# Patient Record
Sex: FEMALE | Race: BLACK OR AFRICAN AMERICAN | NOT HISPANIC OR LATINO | URBAN - METROPOLITAN AREA
[De-identification: names, ages, dates, MRNs, and addresses within clinical notes are randomized per-mention and may not be internally consistent; named-entity substitution may affect disease eponyms.]

---

## 2020-06-03 ENCOUNTER — OFFICE VISIT (OUTPATIENT)
Dept: URBAN - METROPOLITAN AREA SURGERY CENTER 25 | Facility: SURGERY CENTER | Age: 65
End: 2020-06-03

## 2020-07-07 ENCOUNTER — OFFICE VISIT (OUTPATIENT)
Dept: URBAN - METROPOLITAN AREA CLINIC 113 | Facility: CLINIC | Age: 65
End: 2020-07-07

## 2020-07-25 ENCOUNTER — TELEPHONE ENCOUNTER (OUTPATIENT)
Dept: URBAN - METROPOLITAN AREA CLINIC 13 | Facility: CLINIC | Age: 65
End: 2020-07-25

## 2020-07-25 RX ORDER — POLYETHYLENE GLYCOL 3350, SODIUM CHLORIDE, SODIUM BICARBONATE AND POTASSIUM CHLORIDE WITH LEMON FLAVOR 420; 11.2; 5.72; 1.48 G/4L; G/4L; G/4L; G/4L
TAKE  ML AS DIRECTED MIX CONTENTS PER DIRECTIONS, BEGIN DRINKING 1/2 SOLUTION @ 5P, COMPLETE REMAINDER OF SOLUTION 6HR PRIOR TO PROCEDURE POWDER, FOR SOLUTION ORAL
Qty: 1 | Refills: 0 | OUTPATIENT
Start: 2020-05-14 | End: 2020-06-03

## 2020-07-25 RX ORDER — POLYETHYLENE GLYCOL 3350, SODIUM CHLORIDE, SODIUM BICARBONATE AND POTASSIUM CHLORIDE WITH LEMON FLAVOR 420; 11.2; 5.72; 1.48 G/4L; G/4L; G/4L; G/4L
USE AS DIRECTED POWDER, FOR SOLUTION ORAL
Qty: 1 | Refills: 0 | OUTPATIENT
Start: 2014-06-25 | End: 2014-07-15

## 2020-07-25 RX ORDER — ATORVASTATIN CALCIUM 80 MG/1
TAKE 1 TABLET DAILY TABLET, FILM COATED ORAL
Qty: 30 | Refills: 0 | OUTPATIENT
Start: 2013-09-10 | End: 2020-05-14

## 2020-07-26 ENCOUNTER — TELEPHONE ENCOUNTER (OUTPATIENT)
Dept: URBAN - METROPOLITAN AREA CLINIC 13 | Facility: CLINIC | Age: 65
End: 2020-07-26

## 2020-07-26 RX ORDER — DICLOFENAC SODIUM 10 MG/G
APPLY SPARINGLY TO AFFECTED AREA(S) ONCE DAILY AS NEEDED GEL TOPICAL
Refills: 0 | Status: ACTIVE | COMMUNITY
Start: 2020-03-09

## 2020-07-26 RX ORDER — TIZANIDINE 4 MG/1
TAKE 1 TABLET 3 TIMES DAILY AS NEEDED TABLET ORAL
Refills: 0 | Status: ACTIVE | COMMUNITY

## 2020-07-26 RX ORDER — AMOXICILLIN 500 MG/1
CAPSULE ORAL
Qty: 42 | Refills: 0 | Status: ACTIVE | COMMUNITY
Start: 2019-07-15

## 2020-07-26 RX ORDER — PREDNISONE 10 MG/1
TABLET ORAL
Qty: 21 | Refills: 0 | Status: ACTIVE | COMMUNITY
Start: 2014-04-18

## 2020-07-26 RX ORDER — ESTRADIOL 0.5 MG/1
TAKE 1 TABLET DAILY TABLET ORAL
Refills: 0 | Status: ACTIVE | COMMUNITY

## 2020-07-26 RX ORDER — HYDROCHLOROTHIAZIDE 12.5 MG/1
CAPSULE ORAL
Qty: 90 | Refills: 0 | Status: ACTIVE | COMMUNITY
Start: 2014-02-14

## 2020-07-26 RX ORDER — PANTOPRAZOLE 20 MG/1
TABLET, DELAYED RELEASE ORAL
Qty: 180 | Refills: 0 | Status: ACTIVE | COMMUNITY
Start: 2019-06-19

## 2020-07-26 RX ORDER — VALSARTAN AND HYDROCHLOROTHIAZIDE 160; 25 MG/1; MG/1
TAKE 1 TABLET DAILY TABLET, FILM COATED ORAL
Qty: 30 | Refills: 0 | Status: ACTIVE | COMMUNITY
Start: 2014-03-14

## 2020-07-26 RX ORDER — PHENTERMINE HYDROCHLORIDE 37.5 MG/1
TAKE 1 CAPSULE EVERY MORNING BEFORE BREAKFAST CAPSULE ORAL
Refills: 0 | Status: ACTIVE | COMMUNITY

## 2020-07-26 RX ORDER — VALSARTAN 160 MG/1
TABLET ORAL
Qty: 30 | Refills: 0 | Status: ACTIVE | COMMUNITY
Start: 2019-10-25

## 2020-07-26 RX ORDER — DICLOFENAC SODIUM 1 %
GEL (GRAM) TOPICAL
Qty: 255 | Refills: 0 | Status: ACTIVE | COMMUNITY
Start: 2014-04-18

## 2020-07-26 RX ORDER — NEOMYCIN SULFATE, POLYMYXIN B SULFATE AND HYDROCORTISONE 10; 3.5; 1 MG/ML; MG/ML; [USP'U]/ML
SUSPENSION/ DROPS AURICULAR (OTIC)
Qty: 10 | Refills: 0 | Status: ACTIVE | COMMUNITY
Start: 2019-07-15

## 2020-07-26 RX ORDER — ATORVASTATIN CALCIUM 80 MG
TAKE 1 TABLET DAILY TABLET ORAL
Refills: 0 | Status: ACTIVE | COMMUNITY

## 2020-07-26 RX ORDER — ASCORBIC ACID 500 MG
TAKE 1 TABLET DAILY TABLET ORAL
Refills: 0 | Status: ACTIVE | COMMUNITY

## 2020-09-09 ENCOUNTER — OFFICE VISIT (OUTPATIENT)
Dept: URBAN - METROPOLITAN AREA CLINIC 113 | Facility: CLINIC | Age: 65
End: 2020-09-09

## 2021-03-02 ENCOUNTER — TELEPHONE ENCOUNTER (OUTPATIENT)
Dept: URBAN - METROPOLITAN AREA CLINIC 113 | Facility: CLINIC | Age: 66
End: 2021-03-02

## 2021-03-31 ENCOUNTER — OFFICE VISIT (OUTPATIENT)
Dept: URBAN - METROPOLITAN AREA CLINIC 107 | Facility: CLINIC | Age: 66
End: 2021-03-31
Payer: MEDICARE

## 2021-03-31 ENCOUNTER — TELEPHONE ENCOUNTER (OUTPATIENT)
Dept: URBAN - METROPOLITAN AREA CLINIC 113 | Facility: CLINIC | Age: 66
End: 2021-03-31

## 2021-03-31 ENCOUNTER — WEB ENCOUNTER (OUTPATIENT)
Dept: URBAN - METROPOLITAN AREA CLINIC 107 | Facility: CLINIC | Age: 66
End: 2021-03-31

## 2021-03-31 ENCOUNTER — TELEPHONE ENCOUNTER (OUTPATIENT)
Dept: URBAN - METROPOLITAN AREA CLINIC 92 | Facility: CLINIC | Age: 66
End: 2021-03-31

## 2021-03-31 VITALS
HEIGHT: 66 IN | TEMPERATURE: 98.3 F | BODY MASS INDEX: 30.37 KG/M2 | RESPIRATION RATE: 18 BRPM | SYSTOLIC BLOOD PRESSURE: 98 MMHG | HEART RATE: 82 BPM | WEIGHT: 189 LBS | DIASTOLIC BLOOD PRESSURE: 66 MMHG

## 2021-03-31 DIAGNOSIS — K59.09 CHRONIC CONSTIPATION: ICD-10-CM

## 2021-03-31 DIAGNOSIS — R93.5 ABNORMAL CT OF THE ABDOMEN: ICD-10-CM

## 2021-03-31 DIAGNOSIS — K21.9 GERD WITHOUT ESOPHAGITIS: ICD-10-CM

## 2021-03-31 DIAGNOSIS — R74.8 ELEVATED LIVER ENZYMES: ICD-10-CM

## 2021-03-31 DIAGNOSIS — K76.89 HEPATIC CYST: ICD-10-CM

## 2021-03-31 DIAGNOSIS — K20.90 ESOPHAGITIS: ICD-10-CM

## 2021-03-31 PROCEDURE — 99214 OFFICE O/P EST MOD 30 MIN: CPT | Performed by: INTERNAL MEDICINE

## 2021-03-31 RX ORDER — VALSARTAN AND HYDROCHLOROTHIAZIDE 160; 25 MG/1; MG/1
1 TABLET TABLET, FILM COATED ORAL ONCE A DAY
Status: ACTIVE | COMMUNITY

## 2021-03-31 RX ORDER — ESTRADIOL 0.5 MG/1
TAKE 1 TABLET DAILY TABLET ORAL
Refills: 0 | Status: ACTIVE | COMMUNITY

## 2021-03-31 RX ORDER — VALSARTAN 160 MG/1
TABLET ORAL
Qty: 30 | Refills: 0 | Status: ON HOLD | COMMUNITY
Start: 2019-10-25

## 2021-03-31 RX ORDER — PANTOPRAZOLE 20 MG/1
TABLET, DELAYED RELEASE ORAL
Qty: 180 | Refills: 0
Start: 2019-06-19

## 2021-03-31 RX ORDER — LORATADINE 10 MG
1 PACKET MIXED WITH 8 OUNCES OF FLUID TABLET,DISINTEGRATING ORAL ONCE A DAY
Qty: 30 | OUTPATIENT
Start: 2021-03-31 | End: 2021-04-30

## 2021-03-31 RX ORDER — ATORVASTATIN CALCIUM 80 MG
TAKE 1 TABLET DAILY TABLET ORAL
Refills: 0 | Status: ACTIVE | COMMUNITY

## 2021-03-31 RX ORDER — NEOMYCIN SULFATE, POLYMYXIN B SULFATE AND HYDROCORTISONE 10; 3.5; 1 MG/ML; MG/ML; [USP'U]/ML
SUSPENSION/ DROPS AURICULAR (OTIC)
Qty: 10 | Refills: 0 | Status: ON HOLD | COMMUNITY
Start: 2019-07-15

## 2021-03-31 RX ORDER — DICLOFENAC SODIUM 1 %
GEL (GRAM) TOPICAL
Qty: 255 | Refills: 0 | Status: ACTIVE | COMMUNITY
Start: 2014-04-18

## 2021-03-31 RX ORDER — DICLOFENAC SODIUM 10 MG/G
APPLY SPARINGLY TO AFFECTED AREA(S) ONCE DAILY AS NEEDED GEL TOPICAL
Refills: 0 | Status: ON HOLD | COMMUNITY
Start: 2020-03-09

## 2021-03-31 RX ORDER — PHENTERMINE HYDROCHLORIDE 37.5 MG/1
TAKE 1 CAPSULE EVERY MORNING BEFORE BREAKFAST CAPSULE ORAL
Refills: 0 | Status: ON HOLD | COMMUNITY

## 2021-03-31 RX ORDER — PANTOPRAZOLE 20 MG/1
TABLET, DELAYED RELEASE ORAL
Qty: 180 | Refills: 0 | Status: ON HOLD | COMMUNITY
Start: 2019-06-19

## 2021-03-31 RX ORDER — IBUPROFEN 800 MG/1
1 TABLET WITH FOOD OR MILK AS NEEDED TABLET ORAL THREE TIMES A DAY
Status: ACTIVE | COMMUNITY

## 2021-03-31 RX ORDER — AMOXICILLIN 500 MG/1
CAPSULE ORAL
Qty: 42 | Refills: 0 | Status: ON HOLD | COMMUNITY
Start: 2019-07-15

## 2021-03-31 RX ORDER — TIZANIDINE 4 MG/1
TAKE 1 TABLET 3 TIMES DAILY AS NEEDED TABLET ORAL
Refills: 0 | Status: ACTIVE | COMMUNITY

## 2021-03-31 RX ORDER — PREDNISONE 10 MG/1
TABLET ORAL
Qty: 21 | Refills: 0 | Status: ON HOLD | COMMUNITY
Start: 2014-04-18

## 2021-03-31 RX ORDER — ASCORBIC ACID 500 MG
TAKE 1 TABLET DAILY TABLET ORAL
Refills: 0 | Status: ON HOLD | COMMUNITY

## 2021-03-31 RX ORDER — VALSARTAN AND HYDROCHLOROTHIAZIDE 160; 25 MG/1; MG/1
TAKE 1 TABLET DAILY TABLET, FILM COATED ORAL
Qty: 30 | Refills: 0 | Status: ON HOLD | COMMUNITY
Start: 2014-03-14

## 2021-03-31 RX ORDER — PANTOPRAZOLE 20 MG/1
1 TABLET TABLET, DELAYED RELEASE ORAL ONCE A DAY
Qty: 30 TABLET | Refills: 3
Start: 2019-06-19

## 2021-03-31 RX ORDER — HYDROCHLOROTHIAZIDE 12.5 MG/1
CAPSULE ORAL
Qty: 90 | Refills: 0 | Status: ON HOLD | COMMUNITY
Start: 2014-02-14

## 2021-03-31 RX ORDER — GLIMEPIRIDE 4 MG/1
1 TABLET WITH BREAKFAST OR THE FIRST MAIN MEAL OF THE DAY TABLET ORAL ONCE A DAY
Status: ACTIVE | COMMUNITY

## 2021-03-31 RX ORDER — METHYLCELLULOSE 500 MG/1
2 TABLETS WITH A FULL GLASS OF WATER AS NEEDED TABLET ORAL
OUTPATIENT
Start: 2021-03-31

## 2021-03-31 NOTE — HPI-TODAY'S VISIT:
Ms. Newell Is a 66-year-old female with a history of poorly controlled diabetes and chronic back pain referred from Dr. Jackson for further evaluation of the abnormal CT finding of dilated esophagus.  She was last seen in the office July 2020 colonoscopy follow-up and constipation.  The CT was ordered mainly for evaluation of her back.  She is having heartburn and reflux mainly at night when laying down.  Protonix is listed on her medication, but she is no longer taking it.  She is only on OTC Pepcid and fariba seltzer.    She denies dysphagia, nausea, vomiting, chest pain, abdominal pain, change in bowel habits, blood in the stool or weight loss.  She is also on ibuprofen 800mg as needed for her back pain, but she tries not to take it often. She was recently started on diabetes medications.  No prior EGD.  Constipation is not well controlled.  She is using MiraLAX daily and Citrucel as needed.  She has a bowel movement around once a week.  Trulance was not covered with her insurance, but the samples did work well for her.  She also previously used Linzess which was helpful initially, but also lost effectiveness.  She recently underwent CT abdomen with contrast which revealed a small hiatal hernia with moderate dilation of the distal esophagus which is slightly progressed, Moderate fluid dilation of the distal esophagus and fluid in the stomach, multiple small hypoattenuating lesions in the liver consistent with cyst, mild constipation, nodular consolidation within the right lower lobe likely representing scarring or atelectasis.  Labs 1/19/21: AST 42, ALT 49, alk phos 113, T bili 0.7, albumin 5.1, BUN 13, creatinine 0.9, glucose 193;Hemoglobin A1c 8.9,WBC 5.5, hemoglobin 16.3, MCV 91, platelets 252

## 2021-04-13 ENCOUNTER — OFFICE VISIT (OUTPATIENT)
Dept: URBAN - METROPOLITAN AREA SURGERY CENTER 25 | Facility: SURGERY CENTER | Age: 66
End: 2021-04-13
Payer: MEDICARE

## 2021-04-13 ENCOUNTER — CLAIMS CREATED FROM THE CLAIM WINDOW (OUTPATIENT)
Dept: URBAN - METROPOLITAN AREA CLINIC 4 | Facility: CLINIC | Age: 66
End: 2021-04-13
Payer: MEDICARE

## 2021-04-13 DIAGNOSIS — K31.7 POLYP OF STOMACH AND DUODENUM: ICD-10-CM

## 2021-04-13 DIAGNOSIS — K22.2 SCHATZKI'S RING: ICD-10-CM

## 2021-04-13 DIAGNOSIS — K31.89 ACQUIRED DEFORMITY OF DUODENUM: ICD-10-CM

## 2021-04-13 DIAGNOSIS — R12 BURNING REFLUX: ICD-10-CM

## 2021-04-13 PROCEDURE — 88305 TISSUE EXAM BY PATHOLOGIST: CPT | Performed by: PATHOLOGY

## 2021-04-13 PROCEDURE — 43239 EGD BIOPSY SINGLE/MULTIPLE: CPT | Performed by: INTERNAL MEDICINE

## 2021-04-13 PROCEDURE — 43248 EGD GUIDE WIRE INSERTION: CPT | Performed by: INTERNAL MEDICINE

## 2021-04-13 PROCEDURE — 88312 SPECIAL STAINS GROUP 1: CPT | Performed by: PATHOLOGY

## 2021-04-13 PROCEDURE — G8907 PT DOC NO EVENTS ON DISCHARG: HCPCS | Performed by: INTERNAL MEDICINE

## 2021-04-13 RX ORDER — ASCORBIC ACID 500 MG
TAKE 1 TABLET DAILY TABLET ORAL
Refills: 0 | Status: ON HOLD | COMMUNITY

## 2021-04-13 RX ORDER — PREDNISONE 10 MG/1
TABLET ORAL
Qty: 21 | Refills: 0 | Status: ON HOLD | COMMUNITY
Start: 2014-04-18

## 2021-04-13 RX ORDER — DICLOFENAC SODIUM 10 MG/G
APPLY SPARINGLY TO AFFECTED AREA(S) ONCE DAILY AS NEEDED GEL TOPICAL
Refills: 0 | Status: ON HOLD | COMMUNITY
Start: 2020-03-09

## 2021-04-13 RX ORDER — DICLOFENAC SODIUM 1 %
GEL (GRAM) TOPICAL
Qty: 255 | Refills: 0 | Status: ACTIVE | COMMUNITY
Start: 2014-04-18

## 2021-04-13 RX ORDER — NEOMYCIN SULFATE, POLYMYXIN B SULFATE AND HYDROCORTISONE 10; 3.5; 1 MG/ML; MG/ML; [USP'U]/ML
SUSPENSION/ DROPS AURICULAR (OTIC)
Qty: 10 | Refills: 0 | Status: ON HOLD | COMMUNITY
Start: 2019-07-15

## 2021-04-13 RX ORDER — AMOXICILLIN 500 MG/1
CAPSULE ORAL
Qty: 42 | Refills: 0 | Status: ON HOLD | COMMUNITY
Start: 2019-07-15

## 2021-04-13 RX ORDER — GLIMEPIRIDE 4 MG/1
1 TABLET WITH BREAKFAST OR THE FIRST MAIN MEAL OF THE DAY TABLET ORAL ONCE A DAY
Status: ACTIVE | COMMUNITY

## 2021-04-13 RX ORDER — VALSARTAN AND HYDROCHLOROTHIAZIDE 160; 25 MG/1; MG/1
TAKE 1 TABLET DAILY TABLET, FILM COATED ORAL
Qty: 30 | Refills: 0 | Status: ON HOLD | COMMUNITY
Start: 2014-03-14

## 2021-04-13 RX ORDER — TIZANIDINE 4 MG/1
TAKE 1 TABLET 3 TIMES DAILY AS NEEDED TABLET ORAL
Refills: 0 | Status: ACTIVE | COMMUNITY

## 2021-04-13 RX ORDER — IBUPROFEN 800 MG/1
1 TABLET WITH FOOD OR MILK AS NEEDED TABLET ORAL THREE TIMES A DAY
Status: ACTIVE | COMMUNITY

## 2021-04-13 RX ORDER — LORATADINE 10 MG
1 PACKET MIXED WITH 8 OUNCES OF FLUID TABLET,DISINTEGRATING ORAL ONCE A DAY
Qty: 30 | Status: ACTIVE | COMMUNITY
Start: 2021-03-31 | End: 2021-04-30

## 2021-04-13 RX ORDER — PANTOPRAZOLE 20 MG/1
1 TABLET TABLET, DELAYED RELEASE ORAL ONCE A DAY
Qty: 30 TABLET | Refills: 3 | Status: ACTIVE | COMMUNITY
Start: 2019-06-19

## 2021-04-13 RX ORDER — VALSARTAN 160 MG/1
TABLET ORAL
Qty: 30 | Refills: 0 | Status: ON HOLD | COMMUNITY
Start: 2019-10-25

## 2021-04-13 RX ORDER — ESTRADIOL 0.5 MG/1
TAKE 1 TABLET DAILY TABLET ORAL
Refills: 0 | Status: ACTIVE | COMMUNITY

## 2021-04-13 RX ORDER — METHYLCELLULOSE 500 MG/1
2 TABLETS WITH A FULL GLASS OF WATER AS NEEDED TABLET ORAL
Status: ACTIVE | COMMUNITY
Start: 2021-03-31

## 2021-04-13 RX ORDER — PHENTERMINE HYDROCHLORIDE 37.5 MG/1
TAKE 1 CAPSULE EVERY MORNING BEFORE BREAKFAST CAPSULE ORAL
Refills: 0 | Status: ON HOLD | COMMUNITY

## 2021-04-13 RX ORDER — HYDROCHLOROTHIAZIDE 12.5 MG/1
CAPSULE ORAL
Qty: 90 | Refills: 0 | Status: ON HOLD | COMMUNITY
Start: 2014-02-14

## 2021-04-13 RX ORDER — ATORVASTATIN CALCIUM 80 MG
TAKE 1 TABLET DAILY TABLET ORAL
Refills: 0 | Status: ACTIVE | COMMUNITY

## 2021-04-13 RX ORDER — VALSARTAN AND HYDROCHLOROTHIAZIDE 160; 25 MG/1; MG/1
1 TABLET TABLET, FILM COATED ORAL ONCE A DAY
Status: ACTIVE | COMMUNITY

## 2021-04-14 ENCOUNTER — TELEPHONE ENCOUNTER (OUTPATIENT)
Dept: URBAN - METROPOLITAN AREA CLINIC 98 | Facility: CLINIC | Age: 66
End: 2021-04-14

## 2021-04-14 RX ORDER — PANTOPRAZOLE SODIUM 40 MG/1
1 TABLET TABLET, DELAYED RELEASE ORAL ONCE A DAY
Qty: 90 | Refills: 3 | OUTPATIENT

## 2021-05-14 ENCOUNTER — OFFICE VISIT (OUTPATIENT)
Dept: URBAN - METROPOLITAN AREA CLINIC 113 | Facility: CLINIC | Age: 66
End: 2021-05-14

## 2021-06-24 ENCOUNTER — DASHBOARD ENCOUNTERS (OUTPATIENT)
Age: 66
End: 2021-06-24

## 2021-06-24 ENCOUNTER — OFFICE VISIT (OUTPATIENT)
Dept: URBAN - METROPOLITAN AREA CLINIC 113 | Facility: CLINIC | Age: 66
End: 2021-06-24
Payer: MEDICARE

## 2021-06-24 VITALS
DIASTOLIC BLOOD PRESSURE: 74 MMHG | TEMPERATURE: 97.1 F | HEIGHT: 66 IN | HEART RATE: 71 BPM | WEIGHT: 185 LBS | SYSTOLIC BLOOD PRESSURE: 117 MMHG | BODY MASS INDEX: 29.73 KG/M2

## 2021-06-24 DIAGNOSIS — K44.9 HIATAL HERNIA: ICD-10-CM

## 2021-06-24 DIAGNOSIS — K59.09 CHRONIC CONSTIPATION: ICD-10-CM

## 2021-06-24 DIAGNOSIS — K21.9 GERD WITHOUT ESOPHAGITIS: ICD-10-CM

## 2021-06-24 DIAGNOSIS — R74.8 ELEVATED LIVER ENZYMES: ICD-10-CM

## 2021-06-24 DIAGNOSIS — R93.5 ABNORMAL CT OF THE ABDOMEN: ICD-10-CM

## 2021-06-24 DIAGNOSIS — K76.89 HEPATIC CYST: ICD-10-CM

## 2021-06-24 DIAGNOSIS — K20.90 ESOPHAGITIS: ICD-10-CM

## 2021-06-24 PROBLEM — 84089009: Status: ACTIVE | Noted: 2021-06-24

## 2021-06-24 PROBLEM — 707724006: Status: ACTIVE | Noted: 2021-03-31

## 2021-06-24 PROBLEM — 15634181000119107: Status: ACTIVE | Noted: 2021-03-31

## 2021-06-24 PROBLEM — 236069009: Status: ACTIVE | Noted: 2021-03-31

## 2021-06-24 PROBLEM — 266435005: Status: ACTIVE | Noted: 2021-03-31

## 2021-06-24 PROBLEM — 16761005: Status: ACTIVE | Noted: 2021-03-31

## 2021-06-24 PROBLEM — 85057007: Status: ACTIVE | Noted: 2021-03-31

## 2021-06-24 PROCEDURE — 99213 OFFICE O/P EST LOW 20 MIN: CPT | Performed by: INTERNAL MEDICINE

## 2021-06-24 RX ORDER — ESTRADIOL 0.5 MG/1
TAKE 1 TABLET DAILY TABLET ORAL
Refills: 0 | Status: ACTIVE | COMMUNITY

## 2021-06-24 RX ORDER — IBUPROFEN 800 MG/1
1 TABLET WITH FOOD OR MILK AS NEEDED TABLET ORAL THREE TIMES A DAY
OUTPATIENT

## 2021-06-24 RX ORDER — NEOMYCIN SULFATE, POLYMYXIN B SULFATE AND HYDROCORTISONE 10; 3.5; 1 MG/ML; MG/ML; [USP'U]/ML
SUSPENSION/ DROPS AURICULAR (OTIC)
Qty: 10 | Refills: 0 | Status: ON HOLD | COMMUNITY
Start: 2019-07-15

## 2021-06-24 RX ORDER — GLIMEPIRIDE 4 MG/1
1 TABLET WITH BREAKFAST OR THE FIRST MAIN MEAL OF THE DAY TABLET ORAL ONCE A DAY
Status: ACTIVE | COMMUNITY

## 2021-06-24 RX ORDER — IBUPROFEN 800 MG/1
1 TABLET WITH FOOD OR MILK AS NEEDED TABLET ORAL THREE TIMES A DAY
Status: ACTIVE | COMMUNITY

## 2021-06-24 RX ORDER — PREDNISONE 10 MG/1
TABLET ORAL
Qty: 21 | Refills: 0 | Status: ON HOLD | COMMUNITY
Start: 2014-04-18

## 2021-06-24 RX ORDER — VALSARTAN AND HYDROCHLOROTHIAZIDE 160; 25 MG/1; MG/1
TAKE 1 TABLET DAILY TABLET, FILM COATED ORAL
Qty: 30 | Refills: 0 | Status: ON HOLD | COMMUNITY
Start: 2014-03-14

## 2021-06-24 RX ORDER — ASCORBIC ACID 500 MG
TAKE 1 TABLET DAILY TABLET ORAL
Refills: 0 | Status: ON HOLD | COMMUNITY

## 2021-06-24 RX ORDER — PANTOPRAZOLE SODIUM 40 MG/1
1 TABLET TABLET, DELAYED RELEASE ORAL ONCE A DAY
Qty: 90 | Refills: 3 | OUTPATIENT

## 2021-06-24 RX ORDER — VALSARTAN 160 MG/1
TABLET ORAL
Qty: 30 | Refills: 0 | Status: ON HOLD | COMMUNITY
Start: 2019-10-25

## 2021-06-24 RX ORDER — METHYLCELLULOSE 500 MG/1
2 TABLETS WITH A FULL GLASS OF WATER AS NEEDED TABLET ORAL
OUTPATIENT

## 2021-06-24 RX ORDER — VALSARTAN AND HYDROCHLOROTHIAZIDE 160; 25 MG/1; MG/1
1 TABLET TABLET, FILM COATED ORAL ONCE A DAY
Status: ACTIVE | COMMUNITY

## 2021-06-24 RX ORDER — HYDROCHLOROTHIAZIDE 12.5 MG/1
CAPSULE ORAL
Qty: 90 | Refills: 0 | Status: ON HOLD | COMMUNITY
Start: 2014-02-14

## 2021-06-24 RX ORDER — METHYLCELLULOSE 500 MG/1
2 TABLETS WITH A FULL GLASS OF WATER AS NEEDED TABLET ORAL
Status: ACTIVE | COMMUNITY
Start: 2021-03-31

## 2021-06-24 RX ORDER — PHENTERMINE HYDROCHLORIDE 37.5 MG/1
TAKE 1 CAPSULE EVERY MORNING BEFORE BREAKFAST CAPSULE ORAL
Refills: 0 | Status: ON HOLD | COMMUNITY

## 2021-06-24 RX ORDER — TIZANIDINE 4 MG/1
TAKE 1 TABLET 3 TIMES DAILY AS NEEDED TABLET ORAL
Refills: 0 | Status: ACTIVE | COMMUNITY

## 2021-06-24 RX ORDER — DICLOFENAC SODIUM 1 %
GEL (GRAM) TOPICAL
Qty: 255 | Refills: 0 | Status: ACTIVE | COMMUNITY
Start: 2014-04-18

## 2021-06-24 RX ORDER — PANTOPRAZOLE SODIUM 40 MG/1
1 TABLET TABLET, DELAYED RELEASE ORAL ONCE A DAY
Qty: 90 | Refills: 3 | Status: ACTIVE | COMMUNITY

## 2021-06-24 RX ORDER — DICLOFENAC SODIUM 10 MG/G
APPLY SPARINGLY TO AFFECTED AREA(S) ONCE DAILY AS NEEDED GEL TOPICAL
Refills: 0 | Status: ON HOLD | COMMUNITY
Start: 2020-03-09

## 2021-06-24 RX ORDER — AMOXICILLIN 500 MG/1
CAPSULE ORAL
Qty: 42 | Refills: 0 | Status: ON HOLD | COMMUNITY
Start: 2019-07-15

## 2021-06-24 RX ORDER — ATORVASTATIN CALCIUM 80 MG
TAKE 1 TABLET DAILY TABLET ORAL
Refills: 0 | Status: ACTIVE | COMMUNITY

## 2021-06-24 NOTE — HPI-TODAY'S VISIT:
Ms. Newell is a 66-year-old female with a history of poorly controlled diabetes and chronic back pain initially referred from Dr. Jackson for further evaluation of the abnormal CT finding of dilated esophagus.  Recent EGD for further evaluation of dysphagia and heartburn 4/13/21 revealed a normal esophagus, 3 cm hiatal hernia, Hill grade 3 gastroesophageal valve, widely patent Schatzki's ring, moderate erosive gastritis and normal duodenum.  Gastric biopsies were negative for H. pylori.  She is on Protonix once daily and has stopped all NSAIDs.  Her GERD symptoms have essentially resolved and denies any further dysphagia.  She does occasional right-sided abdominal pain that radiates to her back.  It is unrelated to food.  She otherwise denies nausea, vomiting, change in bowel habits, blood in the stool or weight loss.  She is on trulance and is having daily bowel movements.  She recently underwent CT abdomen with contrast which revealed a small hiatal hernia with moderate dilation of the distal esophagus which is slightly progressed, Moderate fluid dilation of the distal esophagus and fluid in the stomach, multiple small hypoattenuating lesions in the liver consistent with cyst, mild constipation, nodular consolidation within the right lower lobe likely representing scarring or atelectasis.  Labs 1/19/21: AST 42, ALT 49, alk phos 113, T bili 0.7, albumin 5.1, BUN 13, creatinine 0.9, glucose 193;Hemoglobin A1c 8.9,WBC 5.5, hemoglobin 16.3, MCV 91, platelets 252